# Patient Record
Sex: MALE | Race: WHITE | Employment: UNEMPLOYED | URBAN - NONMETROPOLITAN AREA
[De-identification: names, ages, dates, MRNs, and addresses within clinical notes are randomized per-mention and may not be internally consistent; named-entity substitution may affect disease eponyms.]

---

## 2022-11-22 ENCOUNTER — HOSPITAL ENCOUNTER (INPATIENT)
Age: 57
LOS: 3 days | Discharge: HOME | DRG: 754 | End: 2022-11-25
Attending: EMERGENCY MEDICINE | Admitting: PSYCHIATRY & NEUROLOGY
Payer: MEDICAID

## 2022-11-22 ENCOUNTER — APPOINTMENT (OUTPATIENT)
Dept: GENERAL RADIOLOGY | Age: 57
DRG: 754 | End: 2022-11-22
Payer: MEDICAID

## 2022-11-22 DIAGNOSIS — F32.A DEPRESSION, UNSPECIFIED DEPRESSION TYPE: Primary | ICD-10-CM

## 2022-11-22 LAB
ACETAMINOPHEN LEVEL: <5 UG/ML (ref 10–30)
ALBUMIN SERPL-MCNC: 4 G/DL (ref 3.5–5.2)
ALP BLD-CCNC: 85 U/L (ref 40–130)
ALT SERPL-CCNC: 20 U/L (ref 5–41)
AMPHETAMINE SCREEN, URINE: NEGATIVE
ANION GAP SERPL CALCULATED.3IONS-SCNC: 9 MMOL/L (ref 7–19)
AST SERPL-CCNC: 12 U/L (ref 5–40)
BARBITURATE SCREEN URINE: NEGATIVE
BASOPHILS ABSOLUTE: 0 K/UL (ref 0–0.2)
BASOPHILS RELATIVE PERCENT: 0.3 % (ref 0–1)
BENZODIAZEPINE SCREEN, URINE: POSITIVE
BILIRUB SERPL-MCNC: 0.4 MG/DL (ref 0.2–1.2)
BILIRUBIN URINE: NEGATIVE
BLOOD, URINE: NEGATIVE
BUN BLDV-MCNC: 18 MG/DL (ref 6–20)
BUPRENORPHINE URINE: NEGATIVE
CALCIUM SERPL-MCNC: 9.4 MG/DL (ref 8.6–10)
CANNABINOID SCREEN URINE: POSITIVE
CHLORIDE BLD-SCNC: 101 MMOL/L (ref 98–111)
CLARITY: CLEAR
CO2: 26 MMOL/L (ref 22–29)
COCAINE METABOLITE SCREEN URINE: NEGATIVE
COLOR: YELLOW
CREAT SERPL-MCNC: 1.1 MG/DL (ref 0.5–1.2)
EOSINOPHILS ABSOLUTE: 0 K/UL (ref 0–0.6)
EOSINOPHILS RELATIVE PERCENT: 0.4 % (ref 0–5)
ETHANOL: <10 MG/DL (ref 0–0.08)
GFR SERPL CREATININE-BSD FRML MDRD: >60 ML/MIN/{1.73_M2}
GLUCOSE BLD-MCNC: 90 MG/DL (ref 74–109)
GLUCOSE URINE: NEGATIVE MG/DL
HCT VFR BLD CALC: 48.5 % (ref 42–52)
HEMOGLOBIN: 17.5 G/DL (ref 14–18)
IMMATURE GRANULOCYTES #: 0 K/UL
KETONES, URINE: 40 MG/DL
LEUKOCYTE ESTERASE, URINE: NEGATIVE
LYMPHOCYTES ABSOLUTE: 2 K/UL (ref 1.1–4.5)
LYMPHOCYTES RELATIVE PERCENT: 22 % (ref 20–40)
Lab: ABNORMAL
MCH RBC QN AUTO: 33.7 PG (ref 27–31)
MCHC RBC AUTO-ENTMCNC: 36.1 G/DL (ref 33–37)
MCV RBC AUTO: 93.4 FL (ref 80–94)
METHADONE SCREEN, URINE: NEGATIVE
METHAMPHETAMINE, URINE: NEGATIVE
MONOCYTES ABSOLUTE: 0.6 K/UL (ref 0–0.9)
MONOCYTES RELATIVE PERCENT: 6.5 % (ref 0–10)
NEUTROPHILS ABSOLUTE: 6.4 K/UL (ref 1.5–7.5)
NEUTROPHILS RELATIVE PERCENT: 70.6 % (ref 50–65)
NITRITE, URINE: NEGATIVE
OPIATE SCREEN URINE: NEGATIVE
OXYCODONE URINE: NEGATIVE
PDW BLD-RTO: 11.4 % (ref 11.5–14.5)
PH UA: 6 (ref 5–8)
PHENCYCLIDINE SCREEN URINE: NEGATIVE
PLATELET # BLD: 203 K/UL (ref 130–400)
PMV BLD AUTO: 10.4 FL (ref 9.4–12.4)
POTASSIUM REFLEX MAGNESIUM: 4.1 MMOL/L (ref 3.5–5)
PROPOXYPHENE SCREEN: NEGATIVE
PROTEIN UA: ABNORMAL MG/DL
RBC # BLD: 5.19 M/UL (ref 4.7–6.1)
SALICYLATE, SERUM: <0.3 MG/DL (ref 3–10)
SARS-COV-2, NAAT: NOT DETECTED
SODIUM BLD-SCNC: 136 MMOL/L (ref 136–145)
SPECIFIC GRAVITY UA: 1.02 (ref 1–1.03)
TOTAL PROTEIN: 7.2 G/DL (ref 6.6–8.7)
TRICYCLIC, URINE: NEGATIVE
UROBILINOGEN, URINE: 0.2 E.U./DL
WBC # BLD: 9.1 K/UL (ref 4.8–10.8)

## 2022-11-22 PROCEDURE — 80179 DRUG ASSAY SALICYLATE: CPT

## 2022-11-22 PROCEDURE — 85025 COMPLETE CBC W/AUTO DIFF WBC: CPT

## 2022-11-22 PROCEDURE — 87635 SARS-COV-2 COVID-19 AMP PRB: CPT

## 2022-11-22 PROCEDURE — 71045 X-RAY EXAM CHEST 1 VIEW: CPT | Performed by: RADIOLOGY

## 2022-11-22 PROCEDURE — 36415 COLL VENOUS BLD VENIPUNCTURE: CPT

## 2022-11-22 PROCEDURE — 99285 EMERGENCY DEPT VISIT HI MDM: CPT

## 2022-11-22 PROCEDURE — 82077 ASSAY SPEC XCP UR&BREATH IA: CPT

## 2022-11-22 PROCEDURE — 80053 COMPREHEN METABOLIC PANEL: CPT

## 2022-11-22 PROCEDURE — 1240000000 HC EMOTIONAL WELLNESS R&B

## 2022-11-22 PROCEDURE — 93005 ELECTROCARDIOGRAM TRACING: CPT | Performed by: EMERGENCY MEDICINE

## 2022-11-22 PROCEDURE — 80306 DRUG TEST PRSMV INSTRMNT: CPT

## 2022-11-22 PROCEDURE — 81003 URINALYSIS AUTO W/O SCOPE: CPT

## 2022-11-22 PROCEDURE — 71045 X-RAY EXAM CHEST 1 VIEW: CPT

## 2022-11-22 PROCEDURE — 6370000000 HC RX 637 (ALT 250 FOR IP): Performed by: PSYCHIATRY & NEUROLOGY

## 2022-11-22 PROCEDURE — 80143 DRUG ASSAY ACETAMINOPHEN: CPT

## 2022-11-22 RX ORDER — ACETAMINOPHEN 325 MG/1
650 TABLET ORAL EVERY 4 HOURS PRN
Status: DISCONTINUED | OUTPATIENT
Start: 2022-11-22 | End: 2022-11-25 | Stop reason: HOSPADM

## 2022-11-22 RX ORDER — HYDROXYZINE HYDROCHLORIDE 25 MG/1
25 TABLET, FILM COATED ORAL 3 TIMES DAILY PRN
Status: DISCONTINUED | OUTPATIENT
Start: 2022-11-22 | End: 2022-11-25 | Stop reason: HOSPADM

## 2022-11-22 RX ORDER — TRAZODONE HYDROCHLORIDE 50 MG/1
50 TABLET ORAL NIGHTLY PRN
Status: DISCONTINUED | OUTPATIENT
Start: 2022-11-22 | End: 2022-11-25 | Stop reason: HOSPADM

## 2022-11-22 RX ORDER — POLYETHYLENE GLYCOL 3350 17 G/17G
17 POWDER, FOR SOLUTION ORAL DAILY PRN
Status: DISCONTINUED | OUTPATIENT
Start: 2022-11-22 | End: 2022-11-25 | Stop reason: HOSPADM

## 2022-11-22 RX ADMIN — TRAZODONE HYDROCHLORIDE 50 MG: 50 TABLET ORAL at 23:48

## 2022-11-22 RX ADMIN — HYDROXYZINE HYDROCHLORIDE 25 MG: 25 TABLET, FILM COATED ORAL at 23:48

## 2022-11-22 ASSESSMENT — ENCOUNTER SYMPTOMS
SORE THROAT: 0
DIARRHEA: 0
SHORTNESS OF BREATH: 1
BACK PAIN: 0
VOMITING: 0
ABDOMINAL PAIN: 0
COUGH: 0

## 2022-11-22 ASSESSMENT — PAIN - FUNCTIONAL ASSESSMENT: PAIN_FUNCTIONAL_ASSESSMENT: 0-10

## 2022-11-22 ASSESSMENT — LIFESTYLE VARIABLES
HOW OFTEN DO YOU HAVE A DRINK CONTAINING ALCOHOL: NEVER
HOW MANY STANDARD DRINKS CONTAINING ALCOHOL DO YOU HAVE ON A TYPICAL DAY: PATIENT DOES NOT DRINK

## 2022-11-22 ASSESSMENT — PAIN DESCRIPTION - LOCATION: LOCATION: HEAD

## 2022-11-22 ASSESSMENT — PAIN SCALES - GENERAL: PAINLEVEL_OUTOF10: 5

## 2022-11-22 ASSESSMENT — PAIN DESCRIPTION - DESCRIPTORS: DESCRIPTORS: BURNING

## 2022-11-22 ASSESSMENT — PATIENT HEALTH QUESTIONNAIRE - PHQ9: SUM OF ALL RESPONSES TO PHQ QUESTIONS 1-9: 14
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
## 2022-11-23 PROBLEM — F32.A DEPRESSION, UNSPECIFIED: Status: ACTIVE | Noted: 2022-11-23

## 2022-11-23 LAB
EKG P AXIS: 73 DEGREES
EKG P-R INTERVAL: 96 MS
EKG Q-T INTERVAL: 332 MS
EKG QRS DURATION: 84 MS
EKG QTC CALCULATION (BAZETT): 409 MS
EKG T AXIS: 74 DEGREES

## 2022-11-23 PROCEDURE — 6370000000 HC RX 637 (ALT 250 FOR IP): Performed by: PSYCHIATRY & NEUROLOGY

## 2022-11-23 PROCEDURE — 90792 PSYCH DIAG EVAL W/MED SRVCS: CPT | Performed by: PSYCHIATRY & NEUROLOGY

## 2022-11-23 PROCEDURE — 1240000000 HC EMOTIONAL WELLNESS R&B

## 2022-11-23 RX ORDER — TADALAFIL 20 MG/1
20 TABLET ORAL PRN
COMMUNITY

## 2022-11-23 RX ORDER — LOSARTAN POTASSIUM 25 MG/1
25 TABLET ORAL DAILY
COMMUNITY

## 2022-11-23 RX ORDER — TRAZODONE HYDROCHLORIDE 50 MG/1
50 TABLET ORAL NIGHTLY
Status: DISCONTINUED | OUTPATIENT
Start: 2022-11-23 | End: 2022-11-25 | Stop reason: HOSPADM

## 2022-11-23 RX ORDER — LOSARTAN POTASSIUM 25 MG/1
25 TABLET ORAL DAILY
Status: DISCONTINUED | OUTPATIENT
Start: 2022-11-23 | End: 2022-11-25 | Stop reason: HOSPADM

## 2022-11-23 RX ORDER — TRAZODONE HYDROCHLORIDE 100 MG/1
100 TABLET ORAL NIGHTLY
COMMUNITY

## 2022-11-23 RX ORDER — AMLODIPINE BESYLATE 10 MG/1
10 TABLET ORAL DAILY
COMMUNITY

## 2022-11-23 RX ORDER — MIRTAZAPINE 7.5 MG/1
7.5 TABLET, FILM COATED ORAL NIGHTLY
Status: DISCONTINUED | OUTPATIENT
Start: 2022-11-23 | End: 2022-11-25 | Stop reason: HOSPADM

## 2022-11-23 RX ORDER — MECOBALAMIN 5000 MCG
5 TABLET,DISINTEGRATING ORAL NIGHTLY
Status: DISCONTINUED | OUTPATIENT
Start: 2022-11-23 | End: 2022-11-25 | Stop reason: HOSPADM

## 2022-11-23 RX ORDER — LORAZEPAM 1 MG/1
1 TABLET ORAL 2 TIMES DAILY PRN
Status: ON HOLD | COMMUNITY
End: 2022-11-25 | Stop reason: HOSPADM

## 2022-11-23 RX ORDER — AMLODIPINE BESYLATE 5 MG/1
10 TABLET ORAL DAILY
Status: DISCONTINUED | OUTPATIENT
Start: 2022-11-23 | End: 2022-11-25 | Stop reason: HOSPADM

## 2022-11-23 RX ADMIN — AMLODIPINE BESYLATE 10 MG: 5 TABLET ORAL at 16:05

## 2022-11-23 RX ADMIN — MIRTAZAPINE 7.5 MG: 7.5 TABLET ORAL at 20:40

## 2022-11-23 RX ADMIN — TRAZODONE HYDROCHLORIDE 50 MG: 50 TABLET ORAL at 20:40

## 2022-11-23 RX ADMIN — HYDROXYZINE HYDROCHLORIDE 25 MG: 25 TABLET, FILM COATED ORAL at 20:40

## 2022-11-23 RX ADMIN — LOSARTAN POTASSIUM 25 MG: 25 TABLET, FILM COATED ORAL at 16:05

## 2022-11-23 RX ADMIN — Medication 5 MG: at 20:40

## 2022-11-23 ASSESSMENT — SLEEP AND FATIGUE QUESTIONNAIRES
AVERAGE NUMBER OF SLEEP HOURS: 3
DO YOU USE A SLEEP AID: NO
DO YOU HAVE DIFFICULTY SLEEPING: YES
SLEEP PATTERN: INSOMNIA

## 2022-11-23 NOTE — PLAN OF CARE
Problem: Pain  Goal: Verbalizes/displays adequate comfort level or baseline comfort level  Outcome: Progressing     Problem: Self Harm/Suicidality  Goal: Will have no self-injury during hospital stay  Description: INTERVENTIONS:  1. Q 30 MINUTES: Routine safety checks  2. Q SHIFT & PRN: Assess risk to determine if routine checks are adequate to maintain patient safety  Outcome: Progressing     Problem: Depression  Goal: Will be euthymic at discharge  Description: INTERVENTIONS:  1. Administer medication as ordered  2. Provide emotional support via 1:1 interaction with staff  3. Encourage involvement in milieu/groups/activities  4. Monitor for social isolation  Outcome: Progressing     Problem: Anxiety  Goal: Will report anxiety at manageable levels  Description: INTERVENTIONS:  1. Administer medication as ordered  2. Teach and rehearse alternative coping skills  3. Provide emotional support with 1:1 interaction with staff  Outcome: Progressing     Problem: Sleep Disturbance  Goal: Will exhibit normal sleeping pattern  Description: INTERVENTIONS:  1. Administer medication as ordered  2. Decrease environmental stimuli, including noise, as appropriate  3.  Discourage social isolation and naps during the day  Outcome: Progressing
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
## 2022-11-23 NOTE — PSYCHOTHERAPY
Group Therapy Note    Date: 11/23/2022  Start Time: 1330  End Time:  1400  Number of Participants: 6    Type of Group: SPIRITUALITY     Wellness Binder Information  Module Name:  Mindfulness  Session Number:      Patient's Goal:  To rest the mind. .. Notes:      Status After Intervention:  Improved    Participation Level:  Active Listener and Interactive    Participation Quality: Appropriate, Attentive, and Sharing      Speech:  normal      Thought Process/Content:       Affective Functioning: Congruent      Mood: calm      Level of consciousness:  Oriented x4      Response to Learning: Able to verbalize current knowledge/experience, Able to verbalize/acknowledge new learning, and Capable of insight      Endings:     Modes of Intervention: Education, Exploration, and Problem-solving      Discipline Responsible:       Signature:  Gretel Chung MA [facility]

## 2022-11-23 NOTE — PLAN OF CARE
Group Therapy Note     Date: 11/23/2022  Start Time: 1100  End Time:  1130  Number of Participants: 9     Type of Group: Psychoeducation     Wellness Binder Information  Module Name:  emotional wellness  Session Number:  5     Patient's Goal:  obstacles to emotional wellness     Notes:  pt was verbally prompted to attend group. Pt refused. Information about obstacles to wellness was provided. Status After Intervention:       Participation Level:      Participation Quality:         Speech:           Thought Process/Content:         Affective Functioning:         Mood:         Level of consciousness:          Response to Learning:         Endings:      Modes of Intervention:         Discipline Responsible: Psychoeducational Specialist        Signature:  Ken Enriquez

## 2022-11-23 NOTE — PROGRESS NOTES
[facility] Adult Unit Daily Assessment  Nursing Progress Note    Room: [facility]624A-01   Name: Shae Gomez   Age: 62 y.o. Gender: male   Dx: Depression, unspecified depression type  Precautions: suicide risk  Inpatient Status: voluntary       SLEEP:  Sleep Quality Good  Sleep Medications: No   PRN Sleep Meds: No       MEDICAL:  Other PRN Meds: Yes   Med Compliant: Yes  Accu-Chek: No  Oxygen/CPAP/BiPAP: No  CIWA/CINA: No   PAIN Assessment: none  Side Effects from medication: No      Metabolic Screening:  No results found for: LABA1C  No results found for: CHOL  No results found for: TRIG  No results found for: HDL  No components found for: LDLCAL  No results found for: LABVLDL  Body mass index is 25.58 kg/m². BP Readings from Last 2 Encounters:   11/23/22 136/87         Medical Bed:   Is patient in a medical bed? no   If medical bed is in use, has nursing secured room while patient is awake and out of the room? NA  Has safety checks by nursing been completed on the bed/room this shift? NA    Protective Factors:  Patient identifies protective factors with nursing staff as follows: Identifies reasons for living: Yes   Supportive Social Network or family: Yes    Belief that suicide is immoral/high spirituality: Yes   Responsibility to family or others/living with family: Yes   Fear of death or dying due to pain and suffering: Yes   Engaged in work or school: No     If Patient is unable to identify, reason why? PSYCH:  Depression: 5   Anxiety: 7   SI denies suicidal ideation      HI Negative for homicidal ideation      AVH:no If Hallucinations are present, describe?          GENERAL:  Appetite: good   Percent Meals: 100%   Social: Yes   Speech: normal   Appearance: appropriately dressed, appropriately groomed, good hygiene, looks younger, and healthy looking    GROUP:  Group Participation: Yes  Participation Quality: Interactive    Notes: patient sitting in the day area watching television and socializing with other patients most of the day and morning. Patient in bed resting when interview was initiated. Patient states that he started thinking about his situation and was experiencing increased anxiety during this time. Patient says that when his anxiety gets bad he experiences shortness of air. Patient rates his depression as a 5 and his anxiety as a 7. Patient denies SI HI and AVH. Patient engaged in conversation about comic books and [employer] but said that he is experiencing more anxiety lately regarding his divorce that happened in September. Will continue to monitor patient for safety.          Electronically signed by Shawn Vides RN on 11/23/22 at 5:21 PM CST
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
## 2022-11-23 NOTE — PROGRESS NOTES
Low Risk Nutrition Note      Recommendations/Plan:  Consult Dietitian if nutrition intervention essential to patient care is needed. Nutrition Assessment:  Pt nutritionally compromised due to edentulism requiring easy to chew texture. Pt allergic to bananas; identifed in EMR. Will monitor PO, weight, and labs and provide nutrition intervention(s) as needed. Reason for Visit:  Positive Nutrition Screen    Current Nutrition Therapies:  ADULT DIET; Easy to Chew; allergic to bananas    Height:  5' 6.93\" (170 cm)    Current Body Weight:  163 lb (73.9 kg)       Diagnosis:  No nutrition diagnosis at this time. Monitoring and Evaluation:  Patient will be monitored per nutrition standards of care.      Discharge Planning:  No needs    Electronically signed by Arnold Cleary MS, RD, LD on 11/23/22 at 8:18 AM CST  Contact:  [phone]

## 2022-11-23 NOTE — PROGRESS NOTES
SW met with patient to complete psychosocial and lifetime CSSR-S on this date. Patients long and short-term goals discussed. Patient voiced understanding. Treatment plan sheet signed. Patient verbalized understanding of the treatment plan. Patient participated in goals and objectives of the treatment plan. Patient discussed safety plan with . SW explained treatment goals with pt. Decreasing depression and anxiety by improvement of positive coping patterns was discussed. Pt acknowledged understanding of treatment goals and signed treatment plan signature sheet. In the last 6 months has the patient been a danger to self: Yes  In the last 6 months has the patient been a danger to others: No  Legal Guardian/POA: No    Provided patient with [employer] handout entitled \"Quitting Smoking. \"  Reviewed handout with patient: addressing dangers of smoking, developing coping skills, and providing basic information about quitting. Patient received all components practical counseling of tobacco practical counseling during the hospital stay.
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
## 2022-11-23 NOTE — PROGRESS NOTES
Behavioral Services  Medicare Certification Upon Admission    I certify that this patient's inpatient psychiatric hospital admission is medically necessary for:    [x] (1) Treatment which could reasonably be expected to improve this patient's condition,       [x] (2) Or for diagnostic study;     AND     [x](2) The inpatient psychiatric services are provided while the individual is under the care of a physician and are included in the individualized plan of care.     Estimated length of stay/service 3-5 days    Plan for post-hospital care TBA    Electronically signed by Jeremy Coy MD on 11/23/2022 at 9:19 AM

## 2022-11-23 NOTE — PROGRESS NOTES
Group Therapy Note    Start Time: 800  End Time:  458  Number of Participants: 8    Type of Group: Community Meeting       Patient's Goal:  \"getting better\"      Notes:      Participation Level:  Active Listener       Participation Quality: Appropriate      Thought Process/Content: Logical      Affective Functioning: Congruent      Mood:  calm      Level of consciousness:  Alert      Modes of Intervention: Support      Discipline Responsible: Behavioral Health Tech II      Signature:  Anahy White Impression: Headache, unspecified: R51.9. Plan: No ophthalmic etiology for headaches. No need for glasses at this moment. Follow-up with Primary Care Provider for evaluation and management.
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
## 2022-11-24 LAB
CHOLESTEROL, TOTAL: 197 MG/DL (ref 160–199)
HBA1C MFR BLD: 5 % (ref 4–6)
HDLC SERPL-MCNC: 37 MG/DL (ref 55–121)
LDL CHOLESTEROL CALCULATED: 138 MG/DL
TRIGL SERPL-MCNC: 109 MG/DL (ref 0–149)
TSH REFLEX FT4: 3.41 UIU/ML (ref 0.35–5.5)
VITAMIN B-12: 507 PG/ML (ref 211–946)
VITAMIN D 25-HYDROXY: 30.3 NG/ML

## 2022-11-24 PROCEDURE — 83036 HEMOGLOBIN GLYCOSYLATED A1C: CPT

## 2022-11-24 PROCEDURE — 36415 COLL VENOUS BLD VENIPUNCTURE: CPT

## 2022-11-24 PROCEDURE — 82306 VITAMIN D 25 HYDROXY: CPT

## 2022-11-24 PROCEDURE — 6370000000 HC RX 637 (ALT 250 FOR IP): Performed by: PSYCHIATRY & NEUROLOGY

## 2022-11-24 PROCEDURE — 99233 SBSQ HOSP IP/OBS HIGH 50: CPT | Performed by: PSYCHIATRY & NEUROLOGY

## 2022-11-24 PROCEDURE — 84443 ASSAY THYROID STIM HORMONE: CPT

## 2022-11-24 PROCEDURE — 1240000000 HC EMOTIONAL WELLNESS R&B

## 2022-11-24 PROCEDURE — 80061 LIPID PANEL: CPT

## 2022-11-24 PROCEDURE — 82607 VITAMIN B-12: CPT

## 2022-11-24 RX ADMIN — HYDROXYZINE HYDROCHLORIDE 25 MG: 25 TABLET, FILM COATED ORAL at 21:36

## 2022-11-24 RX ADMIN — LOSARTAN POTASSIUM 25 MG: 25 TABLET, FILM COATED ORAL at 10:09

## 2022-11-24 RX ADMIN — MIRTAZAPINE 7.5 MG: 7.5 TABLET ORAL at 21:36

## 2022-11-24 RX ADMIN — Medication 5 MG: at 22:02

## 2022-11-24 RX ADMIN — AMLODIPINE BESYLATE 10 MG: 5 TABLET ORAL at 10:09

## 2022-11-24 RX ADMIN — TRAZODONE HYDROCHLORIDE 50 MG: 50 TABLET ORAL at 21:36

## 2022-11-24 RX ADMIN — ACETAMINOPHEN 650 MG: 325 TABLET ORAL at 21:35

## 2022-11-24 ASSESSMENT — PAIN DESCRIPTION - LOCATION
LOCATION: HEAD
LOCATION: HEAD

## 2022-11-24 ASSESSMENT — PAIN DESCRIPTION - PAIN TYPE
TYPE: ACUTE PAIN
TYPE: ACUTE PAIN

## 2022-11-24 ASSESSMENT — PAIN SCALES - GENERAL
PAINLEVEL_OUTOF10: 5
PAINLEVEL_OUTOF10: 4

## 2022-11-24 ASSESSMENT — PAIN DESCRIPTION - ORIENTATION
ORIENTATION: ANTERIOR
ORIENTATION: ANTERIOR

## 2022-11-24 ASSESSMENT — PAIN DESCRIPTION - ONSET
ONSET: ON-GOING
ONSET: GRADUAL

## 2022-11-24 ASSESSMENT — PAIN DESCRIPTION - DESCRIPTORS
DESCRIPTORS: ACHING;DISCOMFORT
DESCRIPTORS: ACHING;DISCOMFORT

## 2022-11-24 ASSESSMENT — PAIN - FUNCTIONAL ASSESSMENT
PAIN_FUNCTIONAL_ASSESSMENT: ACTIVITIES ARE NOT PREVENTED
PAIN_FUNCTIONAL_ASSESSMENT: ACTIVITIES ARE NOT PREVENTED

## 2022-11-24 ASSESSMENT — PAIN DESCRIPTION - FREQUENCY
FREQUENCY: INTERMITTENT
FREQUENCY: INTERMITTENT

## 2022-11-24 NOTE — PROGRESS NOTES
[facility] Adult Unit Daily Assessment  Nursing Progress Note    Room: [facility]624A-01   Name: Daxa Farmer   Age: 62 y.o. Gender: male   Dx: Depression, unspecified depression type  Precautions: suicide risk  Inpatient Status: voluntary       SLEEP:  Sleep Quality Fair  Sleep Medications: Yes   PRN Sleep Meds: No       MEDICAL:  Other PRN Meds: Yes   Med Compliant: Yes  Accu-Chek: No  Oxygen/CPAP/BiPAP: No  CIWA/CINA: No   PAIN Assessment: none  Side Effects from medication: No      Metabolic Screening:  Lab Results   Component Value Date    LABA1C 5.0 11/24/2022     Lab Results   Component Value Date    CHOL 197 11/24/2022     Lab Results   Component Value Date    TRIG 109 11/24/2022     Lab Results   Component Value Date    HDL 37 (L) 11/24/2022     No components found for: LDLCAL  No results found for: LABVLDL  Body mass index is 25.58 kg/m². BP Readings from Last 2 Encounters:   11/24/22 107/75         Medical Bed:   Is patient in a medical bed? no   If medical bed is in use, has nursing secured room while patient is awake and out of the room? NA  Has safety checks by nursing been completed on the bed/room this shift? NA    Protective Factors:  Patient identifies protective factors with nursing staff as follows: Identifies reasons for living: Yes   Supportive Social Network or family: Yes    Belief that suicide is immoral/high spirituality: Yes   Responsibility to family or others/living with family: Yes   Fear of death or dying due to pain and suffering: No   Engaged in work or school: No     If Patient is unable to identify, reason why? PSYCH:  Depression: 1   Anxiety: 1   SI denies suicidal ideation   Risk of Suicide: No Risk  HI Negative for homicidal ideation      AVH:no If Hallucinations are present, describe?          GENERAL:  Appetite: good and increased   Percent Meals: 100% plus reports eating a lot of sandwiches and snacks   Social: minimally   Speech: normal   Appearance: appropriately dressed and healthy looking    GROUP:  Group Participation: Yes  Participation Quality: Active Listener and Interactive    Notes:     Alert, oriented, pleasant, and cooperative. Reports was sleeping good until another patient tried getting out the doors by his room and then he had trouble sleeping after that. Reports appetite has increased and has been snacking quite a bit. Rates depression and anxiety 1/10. Minimally social, compliant with medications, attends groups, and is cooperative with staff. Denies SI, HI, and AVH. No psychosis present.      Electronically signed by Adelia Wheeler RN on 11/24/22 at 12:14 PM CST

## 2022-11-24 NOTE — PLAN OF CARE
Problem: Pain  Goal: Verbalizes/displays adequate comfort level or baseline comfort level  Outcome: Progressing     Problem: Self Harm/Suicidality  Goal: Will have no self-injury during hospital stay  Description: INTERVENTIONS:  1. Q 30 MINUTES: Routine safety checks  2. Q SHIFT & PRN: Assess risk to determine if routine checks are adequate to maintain patient safety  Outcome: Progressing     Problem: Depression  Goal: Will be euthymic at discharge  Description: INTERVENTIONS:  1. Administer medication as ordered  2. Provide emotional support via 1:1 interaction with staff  3. Encourage involvement in milieu/groups/activities  4. Monitor for social isolation  Outcome: Progressing     Problem: Anxiety  Goal: Will report anxiety at manageable levels  Description: INTERVENTIONS:  1. Administer medication as ordered  2. Teach and rehearse alternative coping skills  3. Provide emotional support with 1:1 interaction with staff  Outcome: Progressing  Flowsheets (Taken 11/24/2022 1000)  Will report anxiety at manageable levels:   Provide emotional support with 1:1 interaction with staff   Administer medication as ordered   Teach and rehearse alternative coping skills     Problem: Sleep Disturbance  Goal: Will exhibit normal sleeping pattern  Description: INTERVENTIONS:  1. Administer medication as ordered  2. Decrease environmental stimuli, including noise, as appropriate  3.  Discourage social isolation and naps during the day  Outcome: Progressing

## 2022-11-24 NOTE — PROGRESS NOTES
Department of Psychiatry  Attending Progress Note     Chief complaint:  \"Doing better\"    SUBJECTIVE:   Chart reviewed, discussed with the team. No major issues overnight. Patient is med-compliant. No SEs. Performs ADLs. Social and goes to groups. Patient seen in the TV area this morning. Brighter affect. Smiled. States he is doing better today. Denies SI/HI/AVH. Rates depression 4/10, anxiety 3/10. Slept 7h. Denies physical complaints. Talks about his stressors. Talks about his family, his grandfather who was an immigrant from [facility]. We processed his feelings. Patient states he would like his cellphone to get some numbers. OBJECTIVE    Physical  Wt Readings from Last 3 Encounters:   11/22/22 163 lb (73.9 kg)     Temp Readings from Last 3 Encounters:   11/24/22 98 °F (36.7 °C) (Temporal)     BP Readings from Last 3 Encounters:   11/24/22 107/75     Pulse Readings from Last 3 Encounters:   11/24/22 95        Review of Systems: 14-point review of systems negative except as described above    Mental Status Examination:   Appearance: Stated age. Casually dressed. Gait stable. No abnormal movements or tremor. Behavior: Calm and cooperative, smiled  Speech: Normal in tone, volume, and quality. No slurring, dysarthria or pressured speech noted. Mood: \"Better\"   Affect: Mood congruent. Thought Process: Appears linear. Thought Content: Denies suicidal and homicidal ideation. No overt delusions or paranoia appreciated. Perceptions: Denies auditory or visual hallucinations at present time. Not responding to internal stimuli. Concentration: Intact. Orientation: to person, place, date, and situation. Language: Intact. Fund of information: Intact. Memory: Recent and remote appear intact. Neurovegitative: Fair appetite and sleep. Insight: Improving. Judgment: Improving.     Data  Lab Results   Component Value Date    WBC 9.1 11/22/2022    HGB 17.5 11/22/2022    HCT 48.5 11/22/2022    MCV 93.4 11/22/2022     11/22/2022      Lab Results   Component Value Date     11/22/2022    K 4.1 11/22/2022     11/22/2022    CO2 26 11/22/2022    BUN 18 11/22/2022    CREATININE 1.1 11/22/2022    GLUCOSE 90 11/22/2022    CALCIUM 9.4 11/22/2022    PROT 7.2 11/22/2022    LABALBU 4.0 11/22/2022    BILITOT 0.4 11/22/2022    ALKPHOS 85 11/22/2022    AST 12 11/22/2022    ALT 20 11/22/2022    LABGLOM >60 11/22/2022       Medications    Current Facility-Administered Medications:     mirtazapine (REMERON) tablet 7.5 mg, 7.5 mg, Oral, Nightly, Lester Hernandez MD, 7.5 mg at 11/23/22 2040    traZODone (DESYREL) tablet 50 mg, 50 mg, Oral, Nightly, Lester Hernandez MD, 50 mg at 11/23/22 2040    melatonin disintegrating tablet 5 mg, 5 mg, Oral, Nightly, Lester Hernandez MD, 5 mg at 11/23/22 2040    losartan (COZAAR) tablet 25 mg, 25 mg, Oral, Daily, Lester Hernandez MD, 25 mg at 11/24/22 1009    amLODIPine (NORVASC) tablet 10 mg, 10 mg, Oral, Daily, Lester Hernandez MD, 10 mg at 11/24/22 1009    acetaminophen (TYLENOL) tablet 650 mg, 650 mg, Oral, Q4H PRN, Lester Hernandez MD    polyethylene glycol (GLYCOLAX) packet 17 g, 17 g, Oral, Daily PRN, Lester Hernandez MD    traZODone (DESYREL) tablet 50 mg, 50 mg, Oral, Nightly PRN, Lester Hernandez MD, 50 mg at 11/22/22 2348    hydrOXYzine HCl (ATARAX) tablet 25 mg, 25 mg, Oral, TID PRN, Lester Hernandez MD, 25 mg at 11/23/22 2040    ASSESSMENT AND PLAN  DSM 5 DIAGNOSIS  Impression  Depression unspecified  Anxiety unspecified  Cannabis use disorder  Tobacco use disorder    Continue to observe. Plan to discharge tomorrow if stable. Plan:   1. Psychiatric Medications:   Continue current psychotropic medications as recommended. Monitor for side effects. The risks, benefits, side effects, indications, contraindications, alternatives and adverse effects of the medications have been discussed with patient. 2. Continue to provide supportive psychotherapy.   Encourage socialization and participation in recreational activities. Work on coping skills. 3. Medical Issues:    Continue medical monitoring by Dr. Myrtle Irvin and associates. 4. Disposition:     to provide outpatient resources and facilitate disposition.      Amount of time spent with patient:      35 minutes with greater than 50 % of the time spent in counseling and collaboration of care

## 2022-11-24 NOTE — PROGRESS NOTES
[facility] Unit Daily Assessment  Nursing Progress Note    Room: [facility]624A-01   Name: Megha Godoy   Age: 62 y.o. Gender: male   Dx: Depression, unspecified depression type  Precautions: suicide risk  Inpatient Status: voluntary       SLEEP:  Sleep Quality Fair  Sleep Medications: Yes   PRN Sleep Meds: No       MEDICAL:  Other PRN Meds: Yes   Med Compliant: Yes  Accu-Chek: No  Oxygen/CPAP/BiPAP: No  CIWA/CINA: No   PAIN Assessment: none  Side Effects from medication: No      Metabolic Screening:  No results found for: LABA1C  No results found for: CHOL  No results found for: TRIG  No results found for: HDL  No components found for: LDLCAL  No results found for: LABVLDL  Body mass index is 25.58 kg/m². BP Readings from Last 2 Encounters:   11/23/22 128/80         Medical Bed:   Is patient in a medical bed? no   If medical bed is in use, has nursing secured room while patient is awake and out of the room? NA  Has safety checks by nursing been completed on the bed/room this shift? yes    Protective Factors:  Patient identifies protective factors with nursing staff as follows: Identifies reasons for living: Yes   Supportive Social Network or family: Yes    Belief that suicide is immoral/high spirituality: Yes   Responsibility to family or others/living with family: Yes   Fear of death or dying due to pain and suffering: Yes   Engaged in work or school: No     If Patient is unable to identify, reason why? PSYCH:  Depression: 3   Anxiety: 2   SI denies suicidal ideation   Risk of Suicide: No Risk  HI Negative for homicidal ideation      AVH:no If Hallucinations are present, describe? GENERAL:  Appetite: good   Percent Meals:    Social: Yes   Speech: normal   Appearance: appropriately dressed    GROUP:  Group Participation: Yes  Participation Quality: Minimal    Notes:     Patient was friendly and cooperative this evening.  Patient reported that his anxiety gets worse at night and that the atarax helps keeps his anxiety low. Patient spent time watching TV and was social with peers. Patient is now resting, will continue to monitor.      Electronically signed by Madelin Mathis on 11/23/22 at 10:54 PM CST

## 2022-11-24 NOTE — PROGRESS NOTES
Group Note    Date: 11/23/22  Start Time: 7:00 PM   End Time:7:30 PM     Number of Participants: 3    Type of Group: Wrap-Up    Status After Intervention:  Unchanged    Participation Level: Interactive    Participation Quality: Appropriate    Speech:  normal    Thought Process/Content: Logical    Mood: anxious    Level of consciousness:  Alert and Oriented x4    Response to Learning: Able to verbalize current knowledge/experience    Modes of Intervention: Education and Support    Discipline Responsible: Licensed Practical Nurse     Signature:  Jt Potter LPN

## 2022-11-24 NOTE — PROGRESS NOTES
Group Therapy Note    Start Time: 267  End Time:  508  Number of Participants: 7    Type of Group: Community Meeting       Patient's Goal:  getting better daily routine      Notes:      Participation Level:  Active Listener       Participation Quality: Appropriate      Thought Process/Content: Logical      Affective Functioning: Congruent      Mood:  cam      Level of consciousness:  Alert      Modes of Intervention: Support      Discipline Responsible: Behavioral Health Tech II      Signature:  Romi Kraus

## 2022-11-24 NOTE — H&P
HISTORY and PHYSICAL      CHIEF COMPLAINT:  SI    Reason for Admission:  SI    History Obtained From:  patient, chart    HISTORY OF PRESENT ILLNESS:      The patient is a 62 y.o. male who is admitted to the [place] [address] unit with worsening mood issues. He has no c/o CP or SOA. He has no abdominal pain or N/V. He has no dysuria. He has no HA or dizziness. No fevers. Past Medical History:        Diagnosis Date    Asthma     Hypertension      Past Surgical History:    History reviewed. No pertinent surgical history. Medications Prior to Admission:    Medications Prior to Admission: tadalafil (CIALIS) 20 MG tablet, Take 20 mg by mouth as needed for Erectile Dysfunction  traZODone (DESYREL) 100 MG tablet, Take 100 mg by mouth nightly  losartan (COZAAR) 25 MG tablet, Take 25 mg by mouth daily  amLODIPine (NORVASC) 10 MG tablet, Take 10 mg by mouth daily  LORazepam (ATIVAN) 1 MG tablet, Take 1 mg by mouth 2 times daily as needed for Anxiety. Allergies:  Banana and Lipitor [atorvastatin]    Social History:   TOBACCO:   reports that he has been smoking cigarettes. He has never used smokeless tobacco.  ETOH:   reports that he does not currently use alcohol. DRUGS:   reports that he does not currently use drugs. Family History:   History reviewed. No pertinent family history.   REVIEW OF SYSTEMS:  Constitutional: neg  CV: neg  Pulmonary: neg  GI: neg  : neg  Psych: SI  Neuro: neg  Skin: neg  MusculoSkeletal: neg  HEENT: neg  Joints: neg    Vitals:  /80   Pulse 99   Temp 98.2 °F (36.8 °C) (Temporal)   Resp 18   Ht 5' 6.93\" (1.7 m)   Wt 163 lb (73.9 kg)   SpO2 98%   BMI 25.58 kg/m²     PHYSICAL EXAM:  Gen: NAD, alert  HEENT: WNL  Lymph: no LAD  Neck: no JVD or masses  Chest: CTA bilat  CV: RRR  Abdomen: NT/ND  Extrem: no C/C/E  Neuro: non focal  Skin: no rashes  Joints: no redness    DATA:  I have reviewed the admission labs and imaging tests.    ASSESSMENT AND PLAN:      Principal Problem:    Depression, unspecified depression type, SI--follow with SI    THC Use   Asthma---no issues   HTN--follow BP        Austin Bassett MD  8:58 PM 11/23/2022

## 2022-11-25 VITALS
SYSTOLIC BLOOD PRESSURE: 119 MMHG | OXYGEN SATURATION: 98 % | DIASTOLIC BLOOD PRESSURE: 80 MMHG | HEART RATE: 78 BPM | RESPIRATION RATE: 18 BRPM | WEIGHT: 163 LBS | TEMPERATURE: 97.4 F | BODY MASS INDEX: 25.58 KG/M2 | HEIGHT: 67 IN

## 2022-11-25 PROBLEM — F17.200 TOBACCO USE DISORDER: Chronic | Status: ACTIVE | Noted: 2022-11-25

## 2022-11-25 PROBLEM — F32.A DEPRESSION, UNSPECIFIED DEPRESSION TYPE: Status: RESOLVED | Noted: 2022-11-22 | Resolved: 2022-11-25

## 2022-11-25 PROBLEM — F12.90 CANNABIS USE DISORDER: Chronic | Status: ACTIVE | Noted: 2022-11-25

## 2022-11-25 PROBLEM — F41.9 ANXIETY DISORDER, UNSPECIFIED: Chronic | Status: ACTIVE | Noted: 2022-11-25

## 2022-11-25 PROBLEM — F32.A DEPRESSION: Status: RESOLVED | Noted: 2022-11-22 | Resolved: 2022-11-25

## 2022-11-25 PROCEDURE — 6370000000 HC RX 637 (ALT 250 FOR IP): Performed by: PSYCHIATRY & NEUROLOGY

## 2022-11-25 PROCEDURE — 5130000000 HC BRIDGE APPOINTMENT

## 2022-11-25 PROCEDURE — 99239 HOSP IP/OBS DSCHRG MGMT >30: CPT | Performed by: PSYCHIATRY & NEUROLOGY

## 2022-11-25 RX ORDER — MIRTAZAPINE 7.5 MG/1
7.5 TABLET, FILM COATED ORAL NIGHTLY
Qty: 30 TABLET | Refills: 1 | Status: SHIPPED | OUTPATIENT
Start: 2022-11-25 | End: 2022-11-27 | Stop reason: SDUPTHER

## 2022-11-25 RX ORDER — HYDROXYZINE HYDROCHLORIDE 25 MG/1
25 TABLET, FILM COATED ORAL 3 TIMES DAILY PRN
Qty: 60 TABLET | Refills: 1 | Status: SHIPPED | OUTPATIENT
Start: 2022-11-25 | End: 2022-12-25

## 2022-11-25 RX ADMIN — AMLODIPINE BESYLATE 10 MG: 5 TABLET ORAL at 08:23

## 2022-11-25 RX ADMIN — LOSARTAN POTASSIUM 25 MG: 25 TABLET, FILM COATED ORAL at 08:23

## 2022-11-25 NOTE — PROGRESS NOTES
[facility] Adult Unit Daily Assessment  Nursing Progress Note    Room: [facility]/624A-01   Name: Mikayla Dumas   Age: 62 y.o. Gender: male   Dx: Depression, unspecified depression type  Precautions: suicide risk  Inpatient Status: voluntary       SLEEP:  Sleep Quality Good  Sleep Medications: Yes; melatonin 5mg, trazadone 50mg   PRN Sleep Meds: No       MEDICAL:  Other PRN Meds: Yes; tylenol 650mg, atarax 25mg  Med Compliant: Yes  Accu-Chek: No  Oxygen/CPAP/BiPAP: No  CIWA/CINA: No   PAIN Assessment: receiving treatment  Side Effects from medication: none voiced      Metabolic Screening:  Lab Results   Component Value Date    LABA1C 5.0 11/24/2022     Lab Results   Component Value Date    CHOL 197 11/24/2022     Lab Results   Component Value Date    TRIG 109 11/24/2022     Lab Results   Component Value Date    HDL 37 (L) 11/24/2022     No components found for: LDLCAL  No results found for: LABVLDL  Body mass index is 25.58 kg/m². BP Readings from Last 2 Encounters:   11/24/22 123/79         Medical Bed:   Is patient in a medical bed? no   If medical bed is in use, has nursing secured room while patient is awake and out of the room? NA  Has safety checks by nursing been completed on the bed/room this shift? yes    Protective Factors:  Patient identifies protective factors with nursing staff as follows: Identifies reasons for living: Yes   Supportive Social Network or family: Yes    Belief that suicide is immoral/high spirituality: Yes   Responsibility to family or others/living with family: Yes   Fear of death or dying due to pain and suffering: No   Engaged in work or school: No     If Patient is unable to identify, reason why?  N/A      PSYCH:  Depression: 1   Anxiety: 1   SI denies suicidal ideation   Risk of Suicide: No Risk  HI Negative for homicidal ideation      AVH:no If Hallucinations are present, describe? denies        GENERAL:  Appetite: good   Percent Meals: no meals consumed during this shift   Social: Yes Speech: normal   Appearance: appropriately dressed    GROUP:  Group Participation: Yes  Participation Quality: Minimal    Notes: Pt is calm and cooperative tonight with his interview. He has a bright affect. Mood is congruent. He rates his depression and anxiety a 1 and denies SI, HI and AVH. He was social, med complaint, and dressed appropriately. He has been resting quietly since going to bed. Will continue to monitor for safety as ordered.          Electronically signed by Vernell Ch RN on 11/25/22 at 12:28 AM CST

## 2022-11-25 NOTE — PROGRESS NOTES
Group Note    Date: 11/24/22  Start Time: 15:45 PM   End Time:16:15 PM     Number of Participants: 11    Type of Group: Health Living/Wellness       Status After Intervention:  Improved    Participation Level:  Active Listener and Interactive    Participation Quality: Appropriate    Speech:  normal    Thought Process/Content: Linear    Mood: depressed    Level of consciousness:  Alert and Oriented x4    Response to Learning: Progressing to goal    Modes of Intervention: Education and Support    Discipline Responsible: Registered Nurse     Signature:  Bonne Mohs, RN
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
## 2022-11-25 NOTE — PLAN OF CARE
Group Therapy Note     Date: 11/25/2022  Start Time: 1000  End Time:  1030  Number of Participants: 7     Type of Group: Psychoeducation     Wellness Binder Information  Module Name:  emotional wellness  Session Number:  5     Patient's Goal:  obstacles to emotional wellness     Notes:  pt acknowledged negative thinking as an obstacle to emotional wellness.      Status After Intervention:  Improved     Participation Level: Interactive     Participation Quality: Appropriate, Attentive, and Sharing        Speech:  normal        Thought Process/Content: Logical        Affective Functioning: Congruent        Mood: congruent        Level of consciousness:  Alert, Oriented x4, and Attentive        Response to Learning: Able to verbalize current knowledge/experience        Endings: None Reported     Modes of Intervention: Education        Discipline Responsible: Psychoeducational Specialist        Signature:  Jose Guadalupe Trevino

## 2022-11-25 NOTE — DISCHARGE INSTR - DIET

## 2022-11-25 NOTE — PROGRESS NOTES
Discharge Note     Patient is discharging on this date. Patient denies SI, HI, and AVH at this time. Patient reports improvement in behavior and is leaving unit in overall good condition. SW and patient discussed patient's follow up appointments and importance of attending appointments as scheduled, patient voiced understanding and agreement. Patient and SW also discussed patient's safety plan and patient was able to verbally identify: warning signs, coping strategies, places and people that help make the patient feel better/distract negative thoughts, friends/family/agencies/professionals the patient can reach out to in a crisis, and something that is important to the patient/worth living for. Patient was provided the national suicide prevention hotline number (8-840-517-223-496-6347) as well as local community behavioral health [facility]) crisis number for emergencies (6-598-755-441.846.2557). Discharge Disposition: home -lives with family      Pt to follow up with:  [id number]  on December 1 , 2022 at 9 :Walk in hours  9am-4pm.  for the intake appointment. Patient will follow up with [id number]    On December 1 , 2022   at 9:09  Monday-Friday  for the medication management appointment.      Referral to outpatient tobacco cessation counseling treatment:  Patient refused referral to outpatient tobacco cessation counseling    SW offered to assist patient with transportation, patient accepted transportation assistance - [employer] transportation was provided by cab

## 2022-11-25 NOTE — PROGRESS NOTES
I attempted to call patient's physician for follow up, as well as [address] and offices were closed on 11/25/22. Patient made aware and stated he would make appointments .

## 2022-11-25 NOTE — PROGRESS NOTES
Behavioral Health   Discharge Note  Bridge Appointment completed: Reviewed Discharge Instructions with patient. Patient verbalizes understanding and agreement with the discharge plan using the teachback method. Referral for Outpatient Tobacco Cessation Counseling, upon discharge (brennan X if applicable and completed):    ( )  Hospital staff assisted patient to call Quit Line or faxed referral                                   during hospitalization                  ( )  Recognizing danger situations (included triggers and roadblocks), if not completed on admission                    ( )  Coping skills (new ways to manage stress, exercise, relaxation techniques, changing routine, distraction), if not completed on admission                                                           ( )  Basic information about quitting (benefits of quitting, techniques in how to quit, available resources, if not completed on admission  ( ) Referral for counseling [facility]ed to [facility]   ( x) Patient refused referral  ( x) Patient refused counseling  ( x) Patient refused smoking cessation medication upon discharge    Vaccinations (brennan X if applicable and completed):  ( ) Patient states already received influenza vaccine elsewhere  ( x) Patient received influenza vaccine during this hospitalization  ( ) Patient refused influenza vaccine at this time      Pt discharged with followings belongings:   Dental Appliances: None  Vision - Corrective Lenses: Eyeglasses  Hearing Aid: None  Jewelry: None  Body Piercings Removed: N/A  Clothing: Undergarments, Shirt, Pajamas, Pants, Belt  Other Valuables: Money, [address], Other (Comment) (credit cards)   Valuables sent home with patient. Valuables retrieved from [employer] and returned to patient. Patient left department with  cab via  [facility], discharged to  home. Patient education on aftercare instructions: yes  Patient verbalize understanding of AVS:  yes. Suicidal Ideations?  No Risk of Suicide: No Risk AVH? denies HI? Negative for homicidal ideation       Status EXAM upon discharge:  Mental Status and Behavioral Exam  Normal: Yes  Level of Assistance: Independent/Self  Facial Expression: Brightened  Affect: Appropriate  Level of Consciousness: Alert  Frequency of Checks: 4 times per hour, close  Mood:Normal: Yes  Mood: Depressed, Anxious  Motor Activity:Normal: Yes  Motor Activity: Other (comment) (normal for patient)  Eye Contact: Good  Observed Behavior: Friendly, Cooperative  Sexual Misconduct History: Past - no  Preception: [place] to person, [place] to time, [place] to place, [place] to situation  Attention:Normal: Yes  Attention: Distractible  Thought Processes: Circumstantial  Thought Content:Normal: Yes  Thought Content: Preoccupations  Depression Symptoms: No problems reported or observed. Anxiety Symptoms: No problems reported or observed. Julia Symptoms: No problems reported or observed. Hallucinations: None  Delusions: No  Memory:Normal: Yes  Insight and Judgment: Yes  Insight and Judgment: Other (comment) (improving)    AVS/Transition Record has been discussed with patient and a copy was given to the patient. The AVS/Transition Record was faxed to the next level of care today.

## 2022-11-25 NOTE — DISCHARGE INSTRUCTIONS
Medications:   Medication Summary Provided. I understand that I should take only the medications on my list.   --why and when I need to take each medication. --which side effects to watch for.   --that I should carry my medication information at all times in case of emergency    Situations. --I will take all medications until discontinued by physician. I will take all my medications to follow up appointments. --check with my physician or pharmacist before taking any new medication, over    the counter product or drink alcohol.   --ask about food, drug and dietary supplement interactions. --discard old lists and update records with medication providers. Behavior Health Follow Up:  Original Referral Source: ED  Discharge Diagnosis: [unfilled]  Recomendations for Level of Care:  Follow Up  Patient Status at Discharge: Stable  [facility]  Aftercare plan faxed:    Faxed by: Social Work staff   Date: 22   Time:   Prescriptions sent to patient's pharmacy    General Information:   Questions regarding your bill: Call [phone]   Suicide Hotline (Rescue Crisis) 4-888.235.3506   To obtain results of pending studies call Medical Records at: [phone]   For emergencies and 24 hour/7 days a week contact information: [phone]

## 2022-11-25 NOTE — PROGRESS NOTES
WRAP UP GROUP NOTE:     Start 1900  End 1930    Patient's Goal:  Providing feedback as to their own progress in the care-plan provided. Pt's have an opportunity to explore self-reflective skills and share any additional cares and concerns not yet addressed. Pt effectively participated.      Energy level: normal  Appetite: good  Concentration: improving  Hallucinations: blank  Depression: improved  Anxiety: improved  How I worked today: tried a lot  What helps me sleep: try a relaxation exercise  Any questions/complaints/comments: same, phone for contact numbers    Groups/activities that helped me today were:  Community/groups - \"arts crafts\"  Seeing doctors - \"improving my spirit\"    Electronically signed by Arely Mckeon on 11/24/2022 at 9:42 PM

## 2022-11-26 NOTE — PROGRESS NOTES
SW attempted to contact pt to follow-up with him after he discharged from the unit yesterday to see if he had any questions or concerns that needed to be addressed. SW called the contact number listed for the pt and it went to voicemail. SW left a message, including the contact number, requesting a call back if he has any questions or concerns for us.

## 2022-11-27 ENCOUNTER — HOSPITAL ENCOUNTER (EMERGENCY)
Age: 57
Discharge: HOME | End: 2022-11-27
Payer: MEDICAID

## 2022-11-27 VITALS
DIASTOLIC BLOOD PRESSURE: 82 MMHG | RESPIRATION RATE: 18 BRPM | TEMPERATURE: 98 F | BODY MASS INDEX: 25.58 KG/M2 | SYSTOLIC BLOOD PRESSURE: 125 MMHG | OXYGEN SATURATION: 97 % | HEART RATE: 88 BPM | WEIGHT: 163 LBS

## 2022-11-27 DIAGNOSIS — F41.1 ANXIETY STATE: Primary | ICD-10-CM

## 2022-11-27 LAB
ACETAMINOPHEN LEVEL: <5 UG/ML (ref 10–30)
ALBUMIN SERPL-MCNC: 4 G/DL (ref 3.5–5.2)
ALP BLD-CCNC: 79 U/L (ref 40–130)
ALT SERPL-CCNC: 24 U/L (ref 5–41)
AMPHETAMINE SCREEN, URINE: NEGATIVE
ANION GAP SERPL CALCULATED.3IONS-SCNC: 7 MMOL/L (ref 7–19)
AST SERPL-CCNC: 12 U/L (ref 5–40)
BARBITURATE SCREEN URINE: NEGATIVE
BASOPHILS ABSOLUTE: 0 K/UL (ref 0–0.2)
BASOPHILS RELATIVE PERCENT: 0.5 % (ref 0–1)
BENZODIAZEPINE SCREEN, URINE: POSITIVE
BILIRUB SERPL-MCNC: 0.3 MG/DL (ref 0.2–1.2)
BILIRUBIN URINE: NEGATIVE
BLOOD, URINE: NEGATIVE
BUN BLDV-MCNC: 17 MG/DL (ref 6–20)
BUPRENORPHINE URINE: NEGATIVE
CALCIUM SERPL-MCNC: 9.2 MG/DL (ref 8.6–10)
CANNABINOID SCREEN URINE: NEGATIVE
CHLORIDE BLD-SCNC: 98 MMOL/L (ref 98–111)
CLARITY: CLEAR
CO2: 28 MMOL/L (ref 22–29)
COCAINE METABOLITE SCREEN URINE: NEGATIVE
COLOR: YELLOW
CREAT SERPL-MCNC: 1.1 MG/DL (ref 0.5–1.2)
EOSINOPHILS ABSOLUTE: 0 K/UL (ref 0–0.6)
EOSINOPHILS RELATIVE PERCENT: 0.5 % (ref 0–5)
ETHANOL: <10 MG/DL (ref 0–0.08)
GFR SERPL CREATININE-BSD FRML MDRD: >60 ML/MIN/{1.73_M2}
GLUCOSE BLD-MCNC: 104 MG/DL (ref 74–109)
GLUCOSE URINE: NEGATIVE MG/DL
HCT VFR BLD CALC: 48.7 % (ref 42–52)
HEMOGLOBIN: 17.3 G/DL (ref 14–18)
IMMATURE GRANULOCYTES #: 0 K/UL
KETONES, URINE: NEGATIVE MG/DL
LEUKOCYTE ESTERASE, URINE: NEGATIVE
LYMPHOCYTES ABSOLUTE: 1.4 K/UL (ref 1.1–4.5)
LYMPHOCYTES RELATIVE PERCENT: 16.9 % (ref 20–40)
Lab: ABNORMAL
MCH RBC QN AUTO: 33.6 PG (ref 27–31)
MCHC RBC AUTO-ENTMCNC: 35.5 G/DL (ref 33–37)
MCV RBC AUTO: 94.6 FL (ref 80–94)
METHADONE SCREEN, URINE: NEGATIVE
METHAMPHETAMINE, URINE: NEGATIVE
MONOCYTES ABSOLUTE: 0.5 K/UL (ref 0–0.9)
MONOCYTES RELATIVE PERCENT: 6.4 % (ref 0–10)
NEUTROPHILS ABSOLUTE: 6.2 K/UL (ref 1.5–7.5)
NEUTROPHILS RELATIVE PERCENT: 75.5 % (ref 50–65)
NITRITE, URINE: NEGATIVE
OPIATE SCREEN URINE: NEGATIVE
OXYCODONE URINE: NEGATIVE
PDW BLD-RTO: 11.2 % (ref 11.5–14.5)
PH UA: 6 (ref 5–8)
PHENCYCLIDINE SCREEN URINE: NEGATIVE
PLATELET # BLD: 190 K/UL (ref 130–400)
PMV BLD AUTO: 9.7 FL (ref 9.4–12.4)
POTASSIUM REFLEX MAGNESIUM: 4.1 MMOL/L (ref 3.5–5)
PROPOXYPHENE SCREEN: NEGATIVE
PROTEIN UA: NEGATIVE MG/DL
RBC # BLD: 5.15 M/UL (ref 4.7–6.1)
SALICYLATE, SERUM: <0.3 MG/DL (ref 3–10)
SARS-COV-2, NAAT: NOT DETECTED
SODIUM BLD-SCNC: 133 MMOL/L (ref 136–145)
SPECIFIC GRAVITY UA: 1.01 (ref 1–1.03)
TOTAL PROTEIN: 7.3 G/DL (ref 6.6–8.7)
TRICYCLIC, URINE: NEGATIVE
UROBILINOGEN, URINE: 0.2 E.U./DL
WBC # BLD: 8.2 K/UL (ref 4.8–10.8)

## 2022-11-27 PROCEDURE — 80143 DRUG ASSAY ACETAMINOPHEN: CPT

## 2022-11-27 PROCEDURE — 82077 ASSAY SPEC XCP UR&BREATH IA: CPT

## 2022-11-27 PROCEDURE — 80306 DRUG TEST PRSMV INSTRMNT: CPT

## 2022-11-27 PROCEDURE — 81003 URINALYSIS AUTO W/O SCOPE: CPT

## 2022-11-27 PROCEDURE — 87635 SARS-COV-2 COVID-19 AMP PRB: CPT

## 2022-11-27 PROCEDURE — 80053 COMPREHEN METABOLIC PANEL: CPT

## 2022-11-27 PROCEDURE — 36415 COLL VENOUS BLD VENIPUNCTURE: CPT

## 2022-11-27 PROCEDURE — 99283 EMERGENCY DEPT VISIT LOW MDM: CPT

## 2022-11-27 PROCEDURE — 80179 DRUG ASSAY SALICYLATE: CPT

## 2022-11-27 PROCEDURE — 85025 COMPLETE CBC W/AUTO DIFF WBC: CPT

## 2022-11-27 RX ORDER — MIRTAZAPINE 7.5 MG/1
7.5 TABLET, FILM COATED ORAL NIGHTLY
Qty: 30 TABLET | Refills: 1 | Status: SHIPPED | OUTPATIENT
Start: 2022-11-27 | End: 2022-12-27

## 2022-11-27 ASSESSMENT — ENCOUNTER SYMPTOMS
BACK PAIN: 0
EYE ITCHING: 0
EYE DISCHARGE: 0
COUGH: 0
APNEA: 0
COLOR CHANGE: 0
SHORTNESS OF BREATH: 0
PHOTOPHOBIA: 0

## 2022-11-27 ASSESSMENT — PATIENT HEALTH QUESTIONNAIRE - PHQ9: SUM OF ALL RESPONSES TO PHQ QUESTIONS 1-9: 6

## 2022-11-27 NOTE — ED PROVIDER NOTES
[facility] EMERGENCY DEPT  eMERGENCYdEPARTMENT eNCOUnter      Pt Name: Louie Mahan  MRN: [id number]  [place] 1965  Date of evaluation: 11/27/2022  Provider:TATA Frankel    CHIEF COMPLAINT       Chief Complaint   Patient presents with    Mental Health Problem     Patient states that he was just released from here on Friday, unable to  one of his prescriptions when discharged, feeling very anxious and unable to sleep, denies SI/HI at this time         HISTORY OF PRESENT ILLNESS  (Location/Symptom, Timing/Onset, Context/Setting, Quality, Duration, Modifying Factors, Severity.)   Louie Mahan is a 62 y.o. male who presents to the emergency department with complaints of worsening anxiety poor sleep and nightmares he has debilitating anxiety cannot drive. He was just discharged Friday tells me he was unable to fill any medication due to the holidays and weekend closure he was post to  mirtazapine he is unsure if this is going to help his condition as he is currently very worried denies any SI or HI no hallucinations. His friend here present states is is the worst she is ever seen him. He was just recently admitted and discharge was supposed to fill this medication he is anxious about if this medication will help him he states when he was discharged she was doing well. Endorses very little sleep since discharge. No drug usage that he endorses. [facility] a [facility] \"not soon. \" He states he \"doesn't know if Ill do well going home. \"    [facility]    Nursing Notes were reviewed and I agree. REVIEW OF SYSTEMS    (2-9 systems for level 4, 10 or more for level 5)     Review of Systems   Constitutional:  Negative for activity change, appetite change, chills and fever. HENT:  Negative for congestion and dental problem. Eyes:  Negative for photophobia, discharge and itching. Respiratory:  Negative for apnea, cough and shortness of breath. Cardiovascular:  Negative for chest pain. Musculoskeletal:  Negative for arthralgias, back pain, gait problem, myalgias and neck pain. Skin:  Negative for color change, pallor and rash. Neurological:  Negative for dizziness, seizures and syncope. Psychiatric/Behavioral:  Negative for agitation. The patient is nervous/anxious. Except as noted above the remainder of the review of systems was reviewed and negative. PAST MEDICAL HISTORY     Past Medical History:   Diagnosis Date    Asthma     Hypertension          SURGICAL HISTORY     History reviewed. No pertinent surgical history. CURRENT MEDICATIONS       Current Discharge Medication List        CONTINUE these medications which have NOT CHANGED    Details   hydrOXYzine HCl (ATARAX) 25 MG tablet Take 1 tablet by mouth 3 times daily as needed for Anxiety  Qty: 60 tablet, Refills: 1      tadalafil (CIALIS) 20 MG tablet Take 20 mg by mouth as needed for Erectile Dysfunction      traZODone (DESYREL) 100 MG tablet Take 100 mg by mouth nightly      losartan (COZAAR) 25 MG tablet Take 25 mg by mouth daily      amLODIPine (NORVASC) 10 MG tablet Take 10 mg by mouth daily             ALLERGIES     Banana and Lipitor [atorvastatin]    FAMILY HISTORY     History reviewed. No pertinent family history.        SOCIAL HISTORY       Social History     Socioeconomic History    Marital status:      Spouse name: None    Number of children: None    Years of education: None    Highest education level: None   Tobacco Use    Smoking status: Every Day     Types: Cigarettes    Smokeless tobacco: Never   Vaping Use    Vaping Use: Never used   Substance and Sexual Activity    Alcohol use: Not Currently    Drug use: Not Currently       SCREENINGS    Dilshad Coma Scale  Eye Opening: Spontaneous  Best Verbal Response: Oriented  Best Motor Response: Obeys commands  [place] Coma Scale Score: 15      PHYSICAL EXAM    (up to 7 forlevel 4, 8 or more for level 5)     ED Triage Vitals [11/27/22 1036]   BP Temp Temp Source Heart Rate Resp SpO2 Height Weight   (!) 120/99 97.6 °F (36.4 °C) Oral 100 20 96 % -- 163 lb (73.9 kg)       Physical Exam  Vitals reviewed. Constitutional:       General: He is not in acute distress. Appearance: Normal appearance. He is well-developed. He is not diaphoretic. HENT:      Head: Normocephalic and atraumatic. Right Ear: Tympanic membrane, ear canal and external ear normal.      Left Ear: Tympanic membrane, ear canal and external ear normal.      Nose: Nose normal.      Mouth/Throat:      Mouth: Mucous membranes are moist.   Eyes:      Pupils: Pupils are equal, round, and reactive to light. Neck:      Trachea: No tracheal deviation. Cardiovascular:      Rate and Rhythm: Normal rate and regular rhythm. Heart sounds: Normal heart sounds. No murmur heard. Pulmonary:      Effort: Pulmonary effort is normal.      Breath sounds: Normal breath sounds. No stridor. No wheezing. Chest:      Chest wall: No tenderness. Abdominal:      General: Abdomen is flat. Bowel sounds are normal. There is no distension. Palpations: Abdomen is soft. Tenderness: There is no abdominal tenderness. Musculoskeletal:         General: Normal range of motion. Cervical back: Normal range of motion and neck supple. Skin:     General: Skin is warm and dry. Capillary Refill: Capillary refill takes less than 2 seconds. Neurological:      General: No focal deficit present. Mental Status: He is alert and oriented to person, place, and time. Mental status is at baseline. Psychiatric:         Mood and Affect: Mood normal.         Behavior: Behavior normal.         Thought Content:  Thought content normal.         Judgment: Judgment normal.         DIAGNOSTIC RESULTS     RADIOLOGY:   Non-plain film images such as CT, Ultrasound and MRI are read by the radiologist. Plain radiographic images are visualized and preliminarilyinterpreted by No att. providers found with the below findings:        Interpretation per the Radiologist below, if available at the time of this note:    No orders to display       LABS:  Labs Reviewed   CBC WITH AUTO DIFFERENTIAL - Abnormal; Notable for the following components:       Result Value    MCV 94.6 (*)     MCH 33.6 (*)     RDW 11.2 (*)     Neutrophils % 75.5 (*)     Lymphocytes % 16.9 (*)     All other components within normal limits   COMPREHENSIVE METABOLIC PANEL W/ REFLEX TO MG FOR LOW K - Abnormal; Notable for the following components:    Sodium 133 (*)     All other components within normal limits   DRUG SCRN, BUPRENORPHINE - Abnormal; Notable for the following components:    Benzodiazepine Screen, Urine POSITIVE (*)     All other components within normal limits   SALICYLATE LEVEL - Abnormal; Notable for the following components:    Salicylate, Serum <6.8 (*)     All other components within normal limits   ACETAMINOPHEN LEVEL - Abnormal; Notable for the following components:    Acetaminophen Level <5 (*)     All other components within normal limits   COVID-19, RAPID   URINALYSIS WITH REFLEX TO CULTURE   ETHANOL       All other labs were within normal range or notreturned as of this dictation. RE-ASSESSMENT          EMERGENCY DEPARTMENT COURSE and DIFFERENTIAL DIAGNOSIS/MDM:   Vitals:    Vitals:    11/27/22 1036   BP: (!) 120/99   Pulse: 100   Resp: 20   Temp: 97.6 °F (36.4 °C)   TempSrc: Oral   SpO2: 96%   Weight: 163 lb (73.9 kg)         MDM  Medically cleared psych has seen feels he is appropriate for DC recommends sending his remeron to different pharmacy he can get filled and start taking today. Plan for keeping outpatient follow plans. PROCEDURES:    Procedures      FINAL IMPRESSION      1.  Anxiety state          DISPOSITION/PLAN   DISPOSITION Decision To Discharge 11/27/2022 12:22:48 PM      PATIENT REFERRED TO:  [facility]  [facility]  [phone]    If symptoms worsen    DISCHARGE MEDICATIONS:  Current Discharge Medication List          (Please note that portions of this note were completed with a voice recognition program.  Efforts were made to edit the dictations but occasionallywords are mis-transcribed.)    Kimi Echevarria, [address] [place] [address]  11/27/22 9977

## 2022-11-27 NOTE — SUICIDE SAFETY PLAN
SAFETY PLAN     A suicide Safety Plan is a document that supports someone when they are having thoughts of suicide. Warning Signs that indicate a suicidal crisis may be developing: What (situations, thoughts, feelings, body sensations, behaviors, etc.) do you experience that lets you know you are beginning to think about suicide? 1. Thoughts of no future  2. Racing thoughts  3. Extreme headache     Internal Coping Strategies:  What things can I do (relaxation techniques, hobbies, physical activities, etc.) to take my mind off my problems without contacting another person? 1. Breathing  2. Watch a good show  3. Reading/coloring/puzzles     People and social settings that provide distraction: Who can I call or where can I go to distract me? 1. Name: Gilbert Martinez                   Phone:   2. Name:                     Phone:   3. Place:    [employer]   4. Place: [employer]  whom I can ask for help: Who can I call when I need help - for example, friends, family, clergy, someone else? 1. Name:   Gilbert Martinez                             Phone:   2. Name:                                 Phone:   3. Name:                                 Phone:         Professionals or [address] [place] [address] I can contact during a crisis: Who can I call for help - for example, my doctor, my psychiatrist, my psychologist, a mental health provider, a suicide hotline? 1. Clinician Name: [id number]  Phone: [phone]      Clinician Pager or Emergency Contact #: [phone]     2. Clinician Name: [address]  Phone: [phone]      Clinician Pager or Emergency Contact #: [phone]     3. Suicide Prevention Lifeline: 8-091-966-TALK (0192)     4. [facility] Emergency Department  [address] [facility]     Making the environment safe: How can I make my environment (house/apartment/living space) safer?  For example, can I remove guns, medications, and other items? 1. Remove weapons from the home  2. Remove extra medications from the home.      The One Thing that is most important to me and worth living for is:  Myself and my family

## 2022-11-27 NOTE — SUICIDE SAFETY PLAN
SAFETY PLAN    A suicide Safety Plan is a document that supports someone when they are having thoughts of suicide. Warning Signs that indicate a suicidal crisis may be developing: What (situations, thoughts, feelings, body sensations, behaviors, etc.) do you experience that lets you know you are beginning to think about suicide? 1. Thoughts of no future  2. Racing thoughts  3. Extreme headache    Internal Coping Strategies:  What things can I do (relaxation techniques, hobbies, physical activities, etc.) to take my mind off my problems without contacting another person? 1. Breathing  2. Watch a good show  3. Reading/coloring/puzzles    People and social settings that provide distraction: Who can I call or where can I go to distract me? 1. Name: [employer]  Phone:   2. Name:   Phone:   3. Place:    [employer]   4. Place: [facility] [address] whom I can ask for help: Who can I call when I need help - for example, friends, family, clergy, someone else? 1. Name:   [employer]             Phone:   2. Name:                 Phone:   3. Name:                     Phone:       Professionals or [address] [place] [address] I can contact during a crisis: Who can I call for help - for example, my doctor, my psychiatrist, my psychologist, a mental health provider, a suicide hotline? 1. Clinician Name: [id number]  Phone: [phone]      Clinician Pager or Emergency Contact #: [phone]    2. Clinician Name: [address]  Phone: [phone]      Clinician Pager or Emergency Contact #: [phone]    3. Suicide Prevention Lifeline: 8-878-076-TALK (8902)    4. [facility]  [facility]    Making the environment safe: How can I make my environment (house/apartment/living space) safer? For example, can I remove guns, medications, and other items? 1. Remove weapons from the home  2.  Remove extra medications from the home.     The One Thing that is most important to me and worth living for is:  Myself and my family

## 2022-11-27 NOTE — PROGRESS NOTES
[facility] ADULT INITIAL INTAKE ASSESSMENT     11/27/22    Heather Hightower ,a 62 y.o. male, presents to the ED for a psychiatric assessment. ED Arrival time: 1026  ED physician: [facility] Notification time: 1137  [facility] Assessment start time: 1140  Psychiatrist call time: [phone] with Dr. Lazaro Macias    Patient is referred by: Self    Reason for visit to ED - Presenting problem:     PT states reason for ED visit, \"I wasn't able to fill my medicine, the Remeron, on Friday after I got out before the pharmacy closed for the weekend. I haven't slept well and my anxiety level is high. \"    Patient seen in ED exam room 16 sitting on bed. Patient is dressed in paper scrubs, is calm, cooperative and agreeable to interview. Patient reports he was discharged from [facility] inpatient unit on Friday but wasn't able to fill his Remeron before his pharmacy in [place], [place] closed. He denies suicidal ideations, homicidal ideations, and hallucinations. No delusions or paranoia noted. He reports some anxiety and nervousness since discharge but reports \"I haven't slept well though\". He denies history of suicide attempts and no other inpatient hospitalizations besides [facility] in November 2022. He has no access to guns. Patient will be called tomorrow for outpatient appointment date and time for hospital follow up per psychiatrist as office was closed and appointment was not made on discharge date. Denies alcohol use, reports marijuana use a few weeks ago, smokes about half a pack per day. Patient is future oriented. ED Physician reports: Heather Hightower is a 62 y.o. male who presents to the emergency department with complaints of worsening anxiety poor sleep and nightmares he has debilitating anxiety cannot drive.   He was just discharged Friday tells me he was unable to fill any medication due to the holidays and weekend closure he was post to  mirtazapine he is unsure if this is going to help his condition as he is currently very worried denies any SI or HI no hallucinations. His friend here present states is is the worst she is ever seen him. He was just recently admitted and discharge was supposed to fill this medication he is anxious about if this medication will help him he states when he was discharged she was doing well. Endorses very little sleep since discharge. No drug usage that he endorses. Has a follow up planned with [facility] \"not soon. \" He states he \"doesn't know if Ill do well going home. \"    Duration of symptoms: two days    Current Stressors: health and marital    C-SSRS Completed: yes  Risk Assessment Completed:yes  Risk of Suicide: No Risk  Provider notified of the C-SSRS Screening and Risk Assessment Findings:yes    SI:  denies   Plan: no   Past SI attempts: no   If yes, when and how many times:  Describe suicide attempts:   HI: denies  If yes describe:   Delusions: denies  If yes describe:   Hallucinations: denies   If yes describe:   Risk of Harm to self: Self injurious/self mutilation behaviorsno   If yes explain:   Was it within the past 6 months: no   Risk of Harm to others: no   If yes explain:   Was it within the past 6 months: no   Trauma History:  Anxiety 1-10:  5  Explain if increased:   Depression 1-10:  3  Explain if increased:   Level of function outside hospital decreased: no   If yes explain:   Has patient been completing ADL's: yes    Mental Status Evaluation:     Appearance:  age appropriate   Behavior:  Within Normal Limits   Speech:  normal pitch and normal volume   Mood:  anxious and depressed   Affect:  mood-congruent   Thought Process:  within normal limits   Thought Content:  Denies SI, HI, AVH, not delusional or psychotic   Sensorium:  person, place, time/date, and situation   Cognition:  grossly intact   Insight:  age appropriate         Psychiatric Hospitalizations: Yes   Where & When: [facility] November 2022  Outpatient Psychiatric Treatment: [facility]    Family History:    Family history of mental illness: no   \"Family members with suicide attempt: no   If yes explain (attempted or completed):    Substance Abuse History:     SBIRT Completed: yes  Brief Intervention completed if needed:  (Yes/No)    Current ETOH LEVELS: < 10    ETOH Usage:     Amount drinking daily: denied    Date of last drink:   Longest period of sobriety:    Substance/Chemical Abuse/Recreational Drug History:  Substance used: marijuana  Date of last substance use: two weeks ago  Tobacco Use: yes   History of rehab treatment: denies  How many times in rehab:  Last time in rehab:  Family history of substance abuse:    Opiates: It was discussed with pt they would not be receiving opiates unless they were within 3 days post surgery/acute injury. Patient voiced understanding and agreed. Psychiatric Review Of Systems:     Recent Sleep changes: yes   Recent appetite changes: no   Recent weight changes/Pounds gained (+) or lost (-): no      Medical History:     Medical Diagnosis/Issues: depression, anxiety  CT today in ED:no  Use of 02 or CPAP: no  Ambulatory: yes  Independent or Need assistance with Self Care:     PCP: Arnulfo Lu     Current Medications:   Scheduled Meds: No current facility-administered medications for this encounter.     Current Outpatient Medications:     mirtazapine (REMERON) 7.5 MG tablet, Take 1 tablet by mouth nightly, Disp: 30 tablet, Rfl: 1    hydrOXYzine HCl (ATARAX) 25 MG tablet, Take 1 tablet by mouth 3 times daily as needed for Anxiety, Disp: 60 tablet, Rfl: 1    tadalafil (CIALIS) 20 MG tablet, Take 20 mg by mouth as needed for Erectile Dysfunction, Disp: , Rfl:     traZODone (DESYREL) 100 MG tablet, Take 100 mg by mouth nightly, Disp: , Rfl:     losartan (COZAAR) 25 MG tablet, Take 25 mg by mouth daily, Disp: , Rfl:     amLODIPine (NORVASC) 10 MG tablet, Take 10 mg by mouth daily, Disp: , Rfl:        Collateral Information:     Name: not needed in ED  Relationship: n/a  Phone Number: n/a  Collateral: n/a    Current living arrangement:Lives alone  Current Support System: family  Employment: unemployed    Disposition:     Choose one of the options below for disposition:     1. Decision to admit to :no    I  Does the patient have a guardian or Medical POA: no  Has the guardian been notified or Medical POA:       2. Decision to Discharge:   Does not meet criteria for acceptance to   unit due to: denies SI, HI, AVH, not delusional or psychotic. Safety plan competed and patient given copy. Patient to be called Monday with outpatient follow up appointment date and time.     3. Transferred:       Patient was transferred due to: n/a    Other follow up information provided:      Allen Hansen RN

## 2022-11-27 NOTE — ED NOTES
Let CHUCKY know that labs are back and should be ready for evaluation.       Madison Vasquez  11/27/22 [id number]